# Patient Record
Sex: MALE | Race: ASIAN | URBAN - METROPOLITAN AREA
[De-identification: names, ages, dates, MRNs, and addresses within clinical notes are randomized per-mention and may not be internally consistent; named-entity substitution may affect disease eponyms.]

---

## 2022-11-11 ENCOUNTER — CLAIMS CREATED FROM THE CLAIM WINDOW (OUTPATIENT)
Dept: URBAN - METROPOLITAN AREA CLINIC 23 | Facility: CLINIC | Age: 77
End: 2022-11-11
Payer: COMMERCIAL

## 2022-11-11 ENCOUNTER — WEB ENCOUNTER (OUTPATIENT)
Dept: URBAN - METROPOLITAN AREA CLINIC 23 | Facility: CLINIC | Age: 77
End: 2022-11-11

## 2022-11-11 VITALS
WEIGHT: 159 LBS | SYSTOLIC BLOOD PRESSURE: 146 MMHG | HEART RATE: 70 BPM | DIASTOLIC BLOOD PRESSURE: 73 MMHG | TEMPERATURE: 97.7 F

## 2022-11-11 DIAGNOSIS — R13.19 ESOPHAGEAL DYSPHAGIA: ICD-10-CM

## 2022-11-11 DIAGNOSIS — K21.9 GASTROESOPHAGEAL REFLUX DISEASE, UNSPECIFIED WHETHER ESOPHAGITIS PRESENT: ICD-10-CM

## 2022-11-11 PROCEDURE — G8420 CALC BMI NORM PARAMETERS: HCPCS | Performed by: INTERNAL MEDICINE

## 2022-11-11 PROCEDURE — G9622 NO UNHEAL ETOH USER: HCPCS | Performed by: INTERNAL MEDICINE

## 2022-11-11 PROCEDURE — 1036F TOBACCO NON-USER: CPT | Performed by: INTERNAL MEDICINE

## 2022-11-11 PROCEDURE — 99204 OFFICE O/P NEW MOD 45 MIN: CPT | Performed by: INTERNAL MEDICINE

## 2022-11-11 PROCEDURE — G8427 DOCREV CUR MEDS BY ELIG CLIN: HCPCS | Performed by: INTERNAL MEDICINE

## 2022-11-11 PROCEDURE — 3017F COLORECTAL CA SCREEN DOC REV: CPT | Performed by: INTERNAL MEDICINE

## 2022-11-11 PROCEDURE — G9903 PT SCRN TBCO ID AS NON USER: HCPCS | Performed by: INTERNAL MEDICINE

## 2022-11-11 RX ORDER — PANTOPRAZOLE SODIUM 40 MG/1
1 TABLET TABLET, DELAYED RELEASE ORAL
Qty: 90 TABLET | Refills: 0 | OUTPATIENT
Start: 2022-11-11

## 2022-11-11 RX ORDER — ROSUVASTATIN CALCIUM 10 MG/1
1 TABLET TABLET, FILM COATED ORAL ONCE A DAY
Status: ACTIVE | COMMUNITY

## 2022-11-11 RX ORDER — TAMSULOSIN HYDROCHLORIDE 0.4 MG/1
1 CAPSULE CAPSULE ORAL ONCE A DAY
Status: ACTIVE | COMMUNITY

## 2022-11-11 RX ORDER — OLMESARTAN MEDOXOMIL 20 MG/1
1 TABLET TABLET, FILM COATED ORAL ONCE A DAY
Status: ACTIVE | COMMUNITY

## 2022-11-11 NOTE — HPI-TODAY'S VISIT:
76-year-old male presents for acid reflux.  Has gotten significantly worse past 1 month.  Minor dysphagia occasionally.  Denies blood in stool or melena.  Denies weight loss.  Symptom gets worse postprandially.  Similar episode about a year ago, treated with the Prilosec and warm water. He takes baby aspirin as needed, despite history of triple bypass in 2006.  Cardiologist Dr. Bynum. Denies family history of esophageal cancer or colon cancer. He moves bowel regularly without problem.

## 2022-11-12 ENCOUNTER — DASHBOARD ENCOUNTERS (OUTPATIENT)
Age: 77
End: 2022-11-12

## 2022-11-12 PROBLEM — 235595009: Status: ACTIVE | Noted: 2022-11-11
